# Patient Record
Sex: MALE | URBAN - NONMETROPOLITAN AREA
[De-identification: names, ages, dates, MRNs, and addresses within clinical notes are randomized per-mention and may not be internally consistent; named-entity substitution may affect disease eponyms.]

---

## 2024-04-22 ENCOUNTER — TELEPHONE (OUTPATIENT)
Dept: SURGERY | Age: 49
End: 2024-04-22

## 2024-04-23 RX ORDER — ATORVASTATIN CALCIUM 20 MG/1
TABLET, FILM COATED ORAL
COMMUNITY
Start: 2024-01-23

## 2024-04-23 RX ORDER — ASPIRIN 81 MG/1
81 TABLET, CHEWABLE ORAL DAILY
COMMUNITY

## 2024-04-23 RX ORDER — LISINOPRIL 5 MG/1
TABLET ORAL
COMMUNITY
Start: 2024-04-15

## 2024-04-23 RX ORDER — SEMAGLUTIDE 0.68 MG/ML
INJECTION, SOLUTION SUBCUTANEOUS
COMMUNITY
Start: 2024-03-16

## 2024-04-23 RX ORDER — ACETAMINOPHEN 160 MG
2000 TABLET,DISINTEGRATING ORAL DAILY
COMMUNITY

## 2024-04-23 RX ORDER — GLIMEPIRIDE 2 MG/1
TABLET ORAL
COMMUNITY
Start: 2024-04-16

## 2024-04-23 RX ORDER — METFORMIN HYDROCHLORIDE 500 MG/1
TABLET, EXTENDED RELEASE ORAL
COMMUNITY
Start: 2024-03-18

## 2024-04-23 RX ORDER — CHOLECALCIFEROL (VITAMIN D3) 125 MCG
500 CAPSULE ORAL DAILY
COMMUNITY

## 2024-04-23 RX ORDER — DAPAGLIFLOZIN 10 MG/1
TABLET, FILM COATED ORAL
COMMUNITY
Start: 2024-03-18

## 2024-05-16 ENCOUNTER — TELEPHONE (OUTPATIENT)
Dept: SURGERY | Age: 49
End: 2024-05-16

## 2024-05-16 VITALS — BODY MASS INDEX: 25.48 KG/M2 | HEIGHT: 70 IN | WEIGHT: 178 LBS

## 2024-05-16 NOTE — TELEPHONE ENCOUNTER
H &P Colonoscopy  Patient:Nick Rivera                 :1975  (no) patient has seen Dr. March prior to procedure  PCP: Dr Gomez    CC:Screening for colon cancer        HPI:    ROS:  All 12 systems negative except the positives in the HPI.     Colonoscopy  Abd pain: no  Anemia: no  Bloating:no  Diarrhea: no  Constipation: no  Melena: no  Hematochezia:no  Rectal Bleeding:no  Rectal/Anal Pain:no  Pruritus: no  Family history colon Cancer: no  Previous colon cancer: no  Previous Colon Polyp: no  Change in bowels: no  Decrease caliber of stool: no  Change in color of stool: no    Previous work up date: none       No family history on file.  Social History     Socioeconomic History    Marital status: Unknown     Spouse name: Not on file    Number of children: Not on file    Years of education: Not on file    Highest education level: Not on file   Occupational History    Not on file   Tobacco Use    Smoking status: Not on file    Smokeless tobacco: Not on file   Substance and Sexual Activity    Alcohol use: Not on file    Drug use: Not on file    Sexual activity: Not on file   Other Topics Concern    Not on file   Social History Narrative    Not on file     Social Determinants of Health     Financial Resource Strain: Not on file   Food Insecurity: Not on file   Transportation Needs: Not on file   Physical Activity: Not on file   Stress: Not on file   Social Connections: Not on file   Intimate Partner Violence: Not on file   Housing Stability: Not on file     No past surgical history on file.  Past Medical History:   Diagnosis Date    Hyperlipidemia     Type 2 diabetes mellitus without complication (HCC)     Vitamin D deficiency      Current Outpatient Medications on File Prior to Visit   Medication Sig Dispense Refill    atorvastatin (LIPITOR) 20 MG tablet       FARXIGA 10 MG tablet Take 1 tablet by mouth every morning      lisinopril (PRINIVIL;ZESTRIL) 5 MG tablet Take 1 tablet by mouth daily      metFORMIN

## 2024-05-16 NOTE — TELEPHONE ENCOUNTER
Instructions reviewed over the phone and mailed to the patient. All questions answered and patient denies any further questions at this time. Patient scheduled for colonoscopy on 6-14-24 at [facility] with Dr. March. Instructed patient he can purchase the Miralax/Gatorade bowel prep over the counter at his pharmacy.       Sent medication hold to PCP for DM medication and ASA

## 2024-05-17 NOTE — TELEPHONE ENCOUNTER
Received the fax back regarding medication holds. It states to hold ASA for 7 days and Metformin and Farxiga the day of the prep and day of procedure. Placed this information on pre op instructions. Mailed out bowel prep and pre op instructions to patient.

## 2024-05-31 ENCOUNTER — TELEPHONE (OUTPATIENT)
Dept: SURGERY | Age: 49
End: 2024-05-31

## 2024-07-05 ENCOUNTER — TELEPHONE (OUTPATIENT)
Dept: SURGERY | Age: 49
End: 2024-07-05

## 2024-07-05 NOTE — TELEPHONE ENCOUNTER
Letter created and mailed to patient with results from recent CS at [facility] with Dr. March on 6/28/24. Updated history, health maintenance, and recall. Forwarded results to PCP.